# Patient Record
Sex: FEMALE | Race: OTHER | HISPANIC OR LATINO | URBAN - METROPOLITAN AREA
[De-identification: names, ages, dates, MRNs, and addresses within clinical notes are randomized per-mention and may not be internally consistent; named-entity substitution may affect disease eponyms.]

---

## 2022-05-16 NOTE — PATIENT DISCUSSION
*** 1/19/2023 New rx for restasis sent to [facility].  Got a PA request.  Filled it out and it said [facility] doesn't have a patient by that name.  Patient asked to just send it to [facility] so I switched it to no prescription drug plan and submitted it again.  Still said I need a PA ***.

## 2022-05-16 NOTE — PATIENT DISCUSSION
Patient has a pair of [employer] and she has a computer pair that she got here in 2019.  Seems content with habituals at this time.

## 2022-05-16 NOTE — PATIENT DISCUSSION
Patient uses restasis.  She indicates she has plenty right now.  Told her to let me know when she needs more.

## 2022-11-22 ENCOUNTER — COMPREHENSIVE EXAM (OUTPATIENT)
Dept: URBAN - METROPOLITAN AREA CLINIC 53 | Facility: CLINIC | Age: 57
End: 2022-11-22

## 2022-11-22 DIAGNOSIS — Z01.01: ICD-10-CM

## 2022-11-22 DIAGNOSIS — H40.013: ICD-10-CM

## 2022-11-22 DIAGNOSIS — H52.4: ICD-10-CM

## 2022-11-22 DIAGNOSIS — H16.223: ICD-10-CM

## 2022-11-22 DIAGNOSIS — H25.812: ICD-10-CM

## 2022-11-22 DIAGNOSIS — H25.13: ICD-10-CM

## 2022-11-22 PROCEDURE — 92015 DETERMINE REFRACTIVE STATE: CPT

## 2022-11-22 PROCEDURE — 92014 COMPRE OPH EXAM EST PT 1/>: CPT

## 2022-11-22 RX ORDER — LOTEPREDNOL ETABONATE 5 MG/ML: 1 SUSPENSION/ DROPS OPHTHALMIC TWICE A DAY

## 2022-11-22 RX ORDER — LIFITEGRAST 50 MG/ML: 1 SOLUTION/ DROPS OPHTHALMIC TWICE A DAY

## 2022-11-22 ASSESSMENT — KERATOMETRY
OS_K1POWER_DIOPTERS: 42.50
OS_AXISANGLE_DEGREES: 135
OS_K2POWER_DIOPTERS: 43.00
OD_AXISANGLE_DEGREES: 30
OD_K2POWER_DIOPTERS: 43.00
OD_K1POWER_DIOPTERS: 42.50
OD_AXISANGLE2_DEGREES: 120
OS_AXISANGLE2_DEGREES: 45

## 2022-11-22 ASSESSMENT — VISUAL ACUITY
OU_SC: 20/150
OD_GLARE: 20/25
OU_CC: 20/20-2
OU_CC: J1+
OD_CC: 20/25
OD_SC: 20/150
OS_PH: 20/50
OS_CC: 20/25
OS_GLARE: 20/25
OD_PH: 20/40-1
OS_SC: 20/150
OS_GLARE: 20/20

## 2022-11-22 ASSESSMENT — TONOMETRY
OD_IOP_MMHG: 19
OS_IOP_MMHG: 17

## 2022-12-15 ENCOUNTER — DIAGNOSTICS ONLY (OUTPATIENT)
Dept: URBAN - METROPOLITAN AREA CLINIC 48 | Facility: LOCATION | Age: 57
End: 2022-12-15

## 2022-12-15 ENCOUNTER — APPOINTMENT (RX ONLY)
Dept: URBAN - METROPOLITAN AREA CLINIC 342 | Facility: CLINIC | Age: 57
Setting detail: DERMATOLOGY
End: 2022-12-15

## 2022-12-15 DIAGNOSIS — L40.0 PSORIASIS VULGARIS: ICD-10-CM

## 2022-12-15 DIAGNOSIS — L30.9 DERMATITIS, UNSPECIFIED: ICD-10-CM | Status: INADEQUATELY CONTROLLED

## 2022-12-15 PROCEDURE — 92134 CPTRZ OPH DX IMG PST SGM RTA: CPT

## 2022-12-15 PROCEDURE — ? PRESCRIPTION MEDICATION MANAGEMENT

## 2022-12-15 PROCEDURE — 99204 OFFICE O/P NEW MOD 45 MIN: CPT

## 2022-12-15 PROCEDURE — 92082 INTERMEDIATE VISUAL FIELD XM: CPT

## 2022-12-15 PROCEDURE — ? DIAGNOSIS COMMENT

## 2022-12-15 PROCEDURE — ? COUNSELING

## 2022-12-15 PROCEDURE — ? PRESCRIPTION

## 2022-12-15 RX ORDER — CLOBETASOL PROPIONATE 0.25 MG/G
1 CREAM TOPICAL BID
Qty: 100 | Refills: 1 | Status: ERX | COMMUNITY
Start: 2022-12-15

## 2022-12-15 RX ADMIN — CLOBETASOL PROPIONATE 1: 0.25 CREAM TOPICAL at 00:00

## 2022-12-15 ASSESSMENT — LOCATION SIMPLE DESCRIPTION DERM
LOCATION SIMPLE: LEFT FOOT
LOCATION SIMPLE: RIGHT KNEE
LOCATION SIMPLE: LEFT PRETIBIAL REGION

## 2022-12-15 ASSESSMENT — KERATOMETRY
OD_K1POWER_DIOPTERS: 42.50
OS_AXISANGLE_DEGREES: 135
OD_AXISANGLE_DEGREES: 30
OD_AXISANGLE2_DEGREES: 120
OD_K2POWER_DIOPTERS: 43.00
OS_K1POWER_DIOPTERS: 42.50
OS_AXISANGLE2_DEGREES: 45
OS_K2POWER_DIOPTERS: 43.00

## 2022-12-15 ASSESSMENT — LOCATION ZONE DERM
LOCATION ZONE: FEET
LOCATION ZONE: LEG

## 2022-12-15 ASSESSMENT — LOCATION DETAILED DESCRIPTION DERM
LOCATION DETAILED: LEFT DORSAL FOOT
LOCATION DETAILED: RIGHT KNEE
LOCATION DETAILED: LEFT PROXIMAL PRETIBIAL REGION

## 2022-12-15 NOTE — PROCEDURE: PRESCRIPTION MEDICATION MANAGEMENT
Initiate Treatment: VaniCream free and clear body soap and moisturizer \\nLaundry soap only free and clear
Detail Level: Zone
Discontinue Regimen: Any smelly good scents laundry detergent or any dryer sheets\\Aristeo smelly good lotion or sprays or perfumes
Plan: 6-8 weeks
Render In Strict Bullet Format?: No
Continue Regimen: Skyrizi given by rheumatologist
Initiate Treatment: Impoyz 0.025 % topical cream \\nQuantity: 100.0 g  Days Supply: 30\\nSig: Apply Twice a day to affected plaques on body. Sent t [facility]
Plan: ILK in reserve

## 2022-12-15 NOTE — PROCEDURE: DIAGNOSIS COMMENT
Render Risk Assessment In Note?: no
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
Detail Level: Simple
Comment: Less than 1% BSA, involving elbows and knees. Will change TAC to Impoyz (clobetasol) BID until f/u.

## 2023-01-03 ENCOUNTER — FOLLOW UP (OUTPATIENT)
Dept: URBAN - METROPOLITAN AREA CLINIC 53 | Facility: CLINIC | Age: 58
End: 2023-01-03

## 2023-01-03 DIAGNOSIS — H16.223: ICD-10-CM

## 2023-01-03 PROCEDURE — 92012 INTRM OPH EXAM EST PATIENT: CPT

## 2023-01-03 ASSESSMENT — KERATOMETRY
OS_AXISANGLE2_DEGREES: 45
OS_K1POWER_DIOPTERS: 42.50
OS_K2POWER_DIOPTERS: 43.00
OD_K1POWER_DIOPTERS: 42.50
OS_AXISANGLE_DEGREES: 135
OD_K2POWER_DIOPTERS: 43.00
OD_AXISANGLE2_DEGREES: 120
OD_AXISANGLE_DEGREES: 30

## 2023-01-03 ASSESSMENT — VISUAL ACUITY
OS_CC: 20/20
OD_CC: 20/20

## 2023-01-03 ASSESSMENT — TONOMETRY
OD_IOP_MMHG: 15
OS_IOP_MMHG: 14